# Patient Record
Sex: MALE | Race: WHITE | NOT HISPANIC OR LATINO | Employment: FULL TIME | URBAN - METROPOLITAN AREA
[De-identification: names, ages, dates, MRNs, and addresses within clinical notes are randomized per-mention and may not be internally consistent; named-entity substitution may affect disease eponyms.]

---

## 2017-12-18 ENCOUNTER — OFFICE VISIT (OUTPATIENT)
Dept: FAMILY MEDICINE | Facility: CLINIC | Age: 27
End: 2017-12-18
Payer: OTHER MISCELLANEOUS

## 2017-12-18 VITALS
HEART RATE: 63 BPM | SYSTOLIC BLOOD PRESSURE: 147 MMHG | TEMPERATURE: 97.6 F | OXYGEN SATURATION: 100 % | DIASTOLIC BLOOD PRESSURE: 82 MMHG | WEIGHT: 178 LBS | BODY MASS INDEX: 25.54 KG/M2

## 2017-12-18 DIAGNOSIS — S69.92XS HAND INJURY, LEFT, SEQUELA: Primary | ICD-10-CM

## 2017-12-18 PROCEDURE — 99213 OFFICE O/P EST LOW 20 MIN: CPT | Performed by: FAMILY MEDICINE

## 2017-12-18 RX ORDER — OXYCODONE AND ACETAMINOPHEN 5; 325 MG/1; MG/1
1 TABLET ORAL EVERY 4 HOURS PRN
Qty: 30 TABLET | Refills: 0 | Status: SHIPPED | OUTPATIENT
Start: 2017-12-18 | End: 2018-07-26

## 2017-12-18 ASSESSMENT — PAIN SCALES - GENERAL: PAINLEVEL: SEVERE PAIN (6)

## 2017-12-18 NOTE — MR AVS SNAPSHOT
"              After Visit Summary   2017    Richardson Preston    MRN: [id number]           Patient Information     Date Of Birth          1990        Visit Information        Provider Department      2017 1:50 PM Lam Claire MD [facility]        Today's Diagnoses     Hand injury, left, sequela    -  1       Follow-ups after your visit        Who to contact     If you have questions or need follow up information about today's clinic visit or your schedule please contact [facility] directly at [phone].  Normal or non-critical lab and imaging results will be communicated to you by MyChart, letter or phone within 4 business days after the clinic has received the results. If you do not hear from us within 7 days, please contact the clinic through [email or web address] or phone. If you have a critical or abnormal lab result, we will notify you by phone as soon as possible.  Submit refill requests through Contapps or call your pharmacy and they will forward the refill request to us. Please allow 3 business days for your refill to be completed.          Additional Information About Your Visit        MyChart Information     Contapps lets you send messages to your doctor, view your test results, renew your prescriptions, schedule appointments and more. To sign up, go to [email or web address] . Click on \"Log in\" on the left side of the screen, which will take you to the Welcome page. Then click on \"Sign up Now\" on the right side of the page.     You will be asked to enter the access code listed below, as well as some personal information. Please follow the directions to create your username and password.     Your access code is: [email or web address]  Expires: 3/18/2018  4:57 PM     Your access code will  in 90 days. If you need help or a new code, please call your [facility] or [phone].        Care EveryWhere ID     This is your Care EveryWhere ID. This could be used by other " organizations to access your [place] medical records  [id number]        Your Vitals Were     Pulse Temperature Pulse Oximetry BMI (Body Mass Index)          63 97.6  F (36.4  C) (Oral) 100% 25.54 kg/m2         Blood Pressure from Last 3 Encounters:   12/18/17 147/82   07/05/16 129/81   04/20/16 127/87    Weight from Last 3 Encounters:   12/18/17 178 lb (80.7 kg)   07/05/16 200 lb (90.7 kg)   04/20/16 202 lb 9.6 oz (91.9 kg)              Today, you had the following     No orders found for display         Today's Medication Changes          These changes are accurate as of: 12/18/17  4:57 PM.  If you have any questions, ask your nurse or doctor.               Start taking these medicines.        Dose/Directions    oxyCODONE-acetaminophen 5-325 MG per tablet   Commonly known as:  PERCOCET   Used for:  Hand injury, left, sequela   Started by:  Lam Claire MD        Dose:  1 tablet   Take 1 tablet by mouth every 4 hours as needed for pain (avoid with alcohol) maximum 5 tablet(s) per day   Quantity:  30 tablet   Refills:  0            Where to get your medicines      Some of these will need a paper prescription and others can be bought over the counter.  Ask your nurse if you have questions.     Bring a paper prescription for each of these medications     oxyCODONE-acetaminophen 5-325 MG per tablet                Primary Care Provider Office Phone # Fax #    [facility] [phone] [address] [facility] [address]        Equal Access to Services     HENRIK MCKEON : Hadii aad ku hadasho Soomaali, waaxda luqadaha, qaybta kaalmada adeegyada, amina idikori covarrubias. So [facility] [phone].    ATENCIÓN: Si habla español, tiene a penny disposición servicios gratuitos de asistencia lingüística. Llame al [phone].    We comply with applicable federal civil rights laws and [place] laws. We do not discriminate on the basis of race, color, national origin, age,  disability, sex, sexual orientation, or gender identity.            Thank you!     Thank you for choosing [facility]  for your care. Our goal is always to provide you with excellent care. Hearing back from our patients is one way we can continue to improve our services. Please take a few minutes to complete the written survey that you may receive in the mail after your visit with us. Thank you!             Your Updated Medication List - Protect others around you: Learn how to safely use, store and throw away your medicines at www.disposemymeds.org.          This list is accurate as of: 12/18/17  4:57 PM.  Always use your most recent med list.                   Brand Name Dispense Instructions for use Diagnosis    HYDROcodone-acetaminophen 5-325 MG per tablet    NORCO    8 tablet    Take 1 tablet by mouth every 6 hours as needed for moderate to severe pain Don't drive after taking or mix with alcohol    Boil       oxyCODONE-acetaminophen 5-325 MG per tablet    PERCOCET    30 tablet    Take 1 tablet by mouth every 4 hours as needed for pain (avoid with alcohol) maximum 5 tablet(s) per day    Hand injury, left, sequela

## 2017-12-18 NOTE — PROGRESS NOTES
SUBJECTIVE:  Richardson Preston, a 27 year old male scheduled an appointment to discuss the following issues:  Follow-up hand injury 12/16/17. Xray negative. Swelling and still painful.. Pain slightly better.  No fevers or chills. No pus/redness. Some bruising.   right handed. Splint all time but took off for shower.   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   No ALCOHOL regular.   Emotionally doing ok. Supportive wife.  Hurt elbow too but that feels ok  Per ED note:  Impression and Plan:  Richardson Preston is a 27 y.o. male who presents for evaluation after left arm injury. Signs and symptoms are consistent with left wrist and MCP joint sprains. A broad differential was considered including sprain, strain, fracture, tendon rupture, nerve impingement/compromise, referred pain. Supportive outpatient management is indicated. Rest, ice, and elevation treatment was discussed with the patient. No other complaints during hx or exam. Given the severity of the LAURENCE and amount of pain pt has in hand I am concerned that he may have ligamentous injury and possible game keepers thumb. Pt placed in thumb spica splint and given instructions for outpatient follow up with [facility] hand clinic for further evaluation. As well as discharge precautions and symptoms requiring return to ed. Patient given prescription for Percocet for pain.    Medical, social, surgical, and family histories reviewed.    ROS:   All other ROS negative  OBJECTIVE:  /82  Pulse 63  Temp 97.6  F (36.4  C) (Oral)  Wt 178 lb (80.7 kg)  SpO2 100%  BMI 25.54 kg/m2  EXAM:  GENERAL APPEARANCE: healthy, alert and no distress  RESP: lungs clear to auscultation - no rales, rhonchi or wheezes  CV: regular rates and rhythm, normal S1 S2, no S3 or S4 and no murmur, click or rub -  ABDOMEN:  soft, nontender, no HSM or masses and bowel sounds normal  MS: extremities normal- no gross deformities noted, no evidence of inflammation in joints, FROM in all  extremities.  MS: swelling and tenderness base of left thumb. Pain with RANGE OF MOTION thumb. Good RANGE OF MOTION wrist and elbow.  SKIN: no suspicious lesions or rashes  NEURO: Normal strength and tone, sensory exam grossly normal, mentation intact and speech normal  PSYCH: mentation appears normal and affect normal/bright    ASSESSMENT / PLAN:  (S69.92XS) Hand injury, left, sequela  (primary encounter diagnosis)  Comment: likely ligament/tendon  Plan: oxyCODONE-acetaminophen (PERCOCET) 5-325 MG per        tablet        Continue brace and prn percocet. Avoid with ALCOHOL. Ice/rest. Seeing hand specialist in 4 days. Expected course and warning signs reviewed. Call/email with questions/concerns  New ace/padding given.   Lam Claire MD

## 2017-12-18 NOTE — NURSING NOTE
"Chief Complaint   Patient presents with     Musculoskeletal Problem     left hand injury       Initial /82  Pulse 63  Temp 97.6  F (36.4  C) (Oral)  Wt 178 lb (80.7 kg)  SpO2 100%  BMI 25.54 kg/m2 Estimated body mass index is 25.54 kg/(m^2) as calculated from the following:    Height as of 4/20/16: 5' 10\" (1.778 m).    Weight as of this encounter: 178 lb (80.7 kg).  Medication Reconciliation: complete  Rosa Sams M.A.  "

## 2017-12-22 ENCOUNTER — TELEPHONE (OUTPATIENT)
Dept: FAMILY MEDICINE | Facility: CLINIC | Age: 27
End: 2017-12-22

## 2017-12-22 NOTE — TELEPHONE ENCOUNTER
This is a WC follow up from ER please fax the office note for 12/18/2017 to  [phone]   Claim # is  [id number]   Thank you

## 2018-07-26 ENCOUNTER — OFFICE VISIT (OUTPATIENT)
Dept: URGENT CARE | Facility: URGENT CARE | Age: 28
End: 2018-07-26
Payer: COMMERCIAL

## 2018-07-26 ENCOUNTER — RADIANT APPOINTMENT (OUTPATIENT)
Dept: GENERAL RADIOLOGY | Facility: CLINIC | Age: 28
End: 2018-07-26
Attending: NURSE PRACTITIONER
Payer: COMMERCIAL

## 2018-07-26 VITALS
OXYGEN SATURATION: 96 % | BODY MASS INDEX: 28.7 KG/M2 | WEIGHT: 200 LBS | SYSTOLIC BLOOD PRESSURE: 120 MMHG | TEMPERATURE: 97.4 F | HEART RATE: 71 BPM | DIASTOLIC BLOOD PRESSURE: 75 MMHG

## 2018-07-26 DIAGNOSIS — M25.562 ACUTE PAIN OF LEFT KNEE: Primary | ICD-10-CM

## 2018-07-26 DIAGNOSIS — M25.562 ACUTE PAIN OF LEFT KNEE: ICD-10-CM

## 2018-07-26 LAB
BASOPHILS # BLD AUTO: 0 10E9/L (ref 0–0.2)
BASOPHILS NFR BLD AUTO: 0.3 %
DIFFERENTIAL METHOD BLD: NORMAL
EOSINOPHIL # BLD AUTO: 0.4 10E9/L (ref 0–0.7)
EOSINOPHIL NFR BLD AUTO: 4.6 %
ERYTHROCYTE [DISTWIDTH] IN BLOOD BY AUTOMATED COUNT: 13.3 % (ref 10–15)
ERYTHROCYTE [SEDIMENTATION RATE] IN BLOOD BY WESTERGREN METHOD: 4 MM/H (ref 0–15)
HCT VFR BLD AUTO: 43.2 % (ref 40–53)
HGB BLD-MCNC: 14.8 G/DL (ref 13.3–17.7)
LYMPHOCYTES # BLD AUTO: 4 10E9/L (ref 0.8–5.3)
LYMPHOCYTES NFR BLD AUTO: 43.5 %
MCH RBC QN AUTO: 30.7 PG (ref 26.5–33)
MCHC RBC AUTO-ENTMCNC: 34.3 G/DL (ref 31.5–36.5)
MCV RBC AUTO: 90 FL (ref 78–100)
MONOCYTES # BLD AUTO: 0.7 10E9/L (ref 0–1.3)
MONOCYTES NFR BLD AUTO: 8 %
NEUTROPHILS # BLD AUTO: 4 10E9/L (ref 1.6–8.3)
NEUTROPHILS NFR BLD AUTO: 43.6 %
PLATELET # BLD AUTO: 235 10E9/L (ref 150–450)
RBC # BLD AUTO: 4.82 10E12/L (ref 4.4–5.9)
WBC # BLD AUTO: 9.1 10E9/L (ref 4–11)

## 2018-07-26 PROCEDURE — 85652 RBC SED RATE AUTOMATED: CPT | Performed by: NURSE PRACTITIONER

## 2018-07-26 PROCEDURE — 85025 COMPLETE CBC W/AUTO DIFF WBC: CPT | Performed by: NURSE PRACTITIONER

## 2018-07-26 PROCEDURE — 99214 OFFICE O/P EST MOD 30 MIN: CPT | Performed by: NURSE PRACTITIONER

## 2018-07-26 PROCEDURE — 36415 COLL VENOUS BLD VENIPUNCTURE: CPT | Performed by: NURSE PRACTITIONER

## 2018-07-26 PROCEDURE — 73560 X-RAY EXAM OF KNEE 1 OR 2: CPT | Mod: LT

## 2018-07-26 PROCEDURE — 86618 LYME DISEASE ANTIBODY: CPT | Performed by: NURSE PRACTITIONER

## 2018-07-26 RX ORDER — NAPROXEN 500 MG/1
500 TABLET ORAL 2 TIMES DAILY PRN
Qty: 30 TABLET | Refills: 1 | Status: SHIPPED | OUTPATIENT
Start: 2018-07-26 | End: 2022-04-27

## 2018-07-26 ASSESSMENT — ENCOUNTER SYMPTOMS
NEUROLOGICAL NEGATIVE: 1
CONSTITUTIONAL NEGATIVE: 1
CARDIOVASCULAR NEGATIVE: 1
FOCAL WEAKNESS: 0
RESPIRATORY NEGATIVE: 1

## 2018-07-26 NOTE — LETTER
[facility]  [address] [facility] [address]  Phone: [phone]    July 26, 2018        Richardson Preston  [address] [facility] [address]          To whom it may concern:    RE: Richardson Preston    Patient was seen and treated today at our clinic.  He has been advised to rest and elevate his leg for a couple days.  He will need to be off work tomorrow (7/27/18)  He may return Monday (7/30/18) without restriction.     Please contact me for questions or concerns.      Sincerely,        JONAH Macdonald CNP

## 2018-07-26 NOTE — PATIENT INSTRUCTIONS
Knee Pain  Knee pain is very common. It s especially common in active people who put a lot of pressure on their knees, like runners. It affects women more often than men.  Your kneecap (patella) is a thick, round bone. It covers and protects the front portion of your knee joint. It moves along a groove in your thighbone (femur) as part of the patellofemoral joint. A layer of cartilage surrounds the underside of your kneecap. This layer protects it from grinding against your femur.  When this cartilage softens and breaks down, it can cause knee pain. This is partly because of repetitive stress. The stress irritates the lining of the joint. This causes pain in the underlying bone.  What causes knee pain?  Many things can cause knee pain. You may have more than one cause. Some of these include:    Overuse of the knee joint    The kneecap doesn t line up with the tissue around it    Damage to small nerves in the area    Damage to the ligament-like structure that holds the kneecap in place (retinaculum)    Breakdown of the bone under the cartilage    Swelling in the soft tissues around the kneecap    Injury  You might be more likely to have knee pain if you:    Exercise a lot    Recently increased the intensity of your workouts    Have a body mass index (BMI) greater than 25    Have poor alignment of your kneecap    Walk with your feet turned overly outward or inward    Have weakness in surrounding muscle groups (inner quad or hip adductor muscles)    Have too much tightness in surrounding muscle groups (hamstrings or iliotibial band)    Have a recent history of injury to the area    Are female  Symptoms of knee pain  This type of knee pain is a dull, aching pain in the front of the knee in the area under and around the kneecap. This pain may start quickly or slowly. Your pain might be worse when you squat, run, or sit for a long time. You might also sometimes feel like your knee is giving out. You may have symptoms in  one or both of your knees.  Diagnosing knee pain  Your healthcare provider will ask about your medical history and your symptoms. Be sure to describe any activities that make your knee pain worse. He or she will look at your knee. This will include tests of your range of motion, strength, and areas of pain of your knee. Your knee alignment will be checked.  Your healthcare provider will need to rule out other causes of your knee pain, such as arthritis. You may need an imaging test, such as an X-ray or MRI.  Treatment for knee pain  Treatments that can help ease your symptoms may include:    Avoiding activities for a while that make your pain worse, returning to activity over time    Icing the outside of your knee when it causes you pain    Taking over-the-counter pain medicine    Wearing a knee brace or taping your knee to support it    Wearing special shoe inserts to help keep your feet in the proper alignment    Doing special exercises to stretch and strengthen the muscles around your hip and your knee  These steps help most people manage knee pain. But some cases of knee pain need to be treated with surgery. You may need surgery right away. Or you may need it later if other treatments don t work. Your healthcare provider may refer you to an orthopedic surgeon. He or she will talk with you about your choices.  Preventing knee pain  Losing weight and correcting excess muscle tightness or muscle weakness may help lower your risk.  In some cases, you can prevent knee pain. To help prevent a flare-up of knee pain, you do these things:    Regularly do all the exercises your doctor or physical therapist advises    Support your knee as advised by your doctor or physical therapist    Increase training gradually, and ease up on training when needed    Have an expert check your gait for running or other sporting activities    Stretch properly before and after exercise    Replace your running shoes regularly    Lose excess  weight     When to call your healthcare provider  Call your healthcare provider right away if:    Your symptoms don t get better after a few weeks of treatment    You have any new symptoms   Date Last Reviewed: 4/1/2017 2000-2017 [employer]. [address] [place] [address], [place] [address]. All rights reserved. This information is not intended as a substitute for professional medical care. Always follow your healthcare professional's instructions.

## 2018-07-26 NOTE — MR AVS SNAPSHOT
After Visit Summary   7/26/2018    Richardson Preston    MRN: [id number]           Patient Information     Date Of Birth          1990        Visit Information        Provider Department      7/26/2018 5:15 PM Celina Gage APRN [facility]        Today's Diagnoses     Acute pain of left knee    -  1      Care Instructions      Knee Pain  Knee pain is very common. It s especially common in active people who put a lot of pressure on their knees, like runners. It affects women more often than men.  Your kneecap (patella) is a thick, round bone. It covers and protects the front portion of your knee joint. It moves along a groove in your thighbone (femur) as part of the patellofemoral joint. A layer of cartilage surrounds the underside of your kneecap. This layer protects it from grinding against your femur.  When this cartilage softens and breaks down, it can cause knee pain. This is partly because of repetitive stress. The stress irritates the lining of the joint. This causes pain in the underlying bone.  What causes knee pain?  Many things can cause knee pain. You may have more than one cause. Some of these include:    Overuse of the knee joint    The kneecap doesn t line up with the tissue around it    Damage to small nerves in the area    Damage to the ligament-like structure that holds the kneecap in place (retinaculum)    Breakdown of the bone under the cartilage    Swelling in the soft tissues around the kneecap    Injury  You might be more likely to have knee pain if you:    Exercise a lot    Recently increased the intensity of your workouts    Have a body mass index (BMI) greater than 25    Have poor alignment of your kneecap    Walk with your feet turned overly outward or inward    Have weakness in surrounding muscle groups (inner quad or hip adductor muscles)    Have too much tightness in surrounding muscle groups (hamstrings or iliotibial band)    Have a recent  history of injury to the area    Are female  Symptoms of knee pain  This type of knee pain is a dull, aching pain in the front of the knee in the area under and around the kneecap. This pain may start quickly or slowly. Your pain might be worse when you squat, run, or sit for a long time. You might also sometimes feel like your knee is giving out. You may have symptoms in one or both of your knees.  Diagnosing knee pain  Your healthcare provider will ask about your medical history and your symptoms. Be sure to describe any activities that make your knee pain worse. He or she will look at your knee. This will include tests of your range of motion, strength, and areas of pain of your knee. Your knee alignment will be checked.  Your healthcare provider will need to rule out other causes of your knee pain, such as arthritis. You may need an imaging test, such as an X-ray or MRI.  Treatment for knee pain  Treatments that can help ease your symptoms may include:    Avoiding activities for a while that make your pain worse, returning to activity over time    Icing the outside of your knee when it causes you pain    Taking over-the-counter pain medicine    Wearing a knee brace or taping your knee to support it    Wearing special shoe inserts to help keep your feet in the proper alignment    Doing special exercises to stretch and strengthen the muscles around your hip and your knee  These steps help most people manage knee pain. But some cases of knee pain need to be treated with surgery. You may need surgery right away. Or you may need it later if other treatments don t work. Your healthcare provider may refer you to an orthopedic surgeon. He or she will talk with you about your choices.  Preventing knee pain  Losing weight and correcting excess muscle tightness or muscle weakness may help lower your risk.  In some cases, you can prevent knee pain. To help prevent a flare-up of knee pain, you do these things:    Regularly  do all the exercises your doctor or physical therapist advises    Support your knee as advised by your doctor or physical therapist    Increase training gradually, and ease up on training when needed    Have an expert check your gait for running or other sporting activities    Stretch properly before and after exercise    Replace your running shoes regularly    Lose excess weight     When to call your healthcare provider  Call your healthcare provider right away if:    Your symptoms don t get better after a few weeks of treatment    You have any new symptoms   Date Last Reviewed: 4/1/2017 2000-2017 [employer]. [address] [place] [address]. All rights reserved. This information is not intended as a substitute for professional medical care. Always follow your healthcare professional's instructions.                Follow-ups after your visit        Who to contact     If you have questions or need follow up information about today's clinic visit or your schedule please contact [facility] directly at [phone].  Normal or non-critical lab and imaging results will be communicated to you by MyChart, letter or phone within 4 business days after the clinic has received the results. If you do not hear from us within 7 days, please contact the clinic through MyChart or phone. If you have a critical or abnormal lab result, we will notify you by phone as soon as possible.  Submit refill requests through [employer] or call your pharmacy and they will forward the refill request to us. Please allow 3 business days for your refill to be completed.          Additional Information About Your Visit        Care EveryWhere ID     This is your Care EveryWhere ID. This could be used by other organizations to access your [place] medical records  [id number]        Your Vitals Were     Pulse Temperature Pulse Oximetry BMI (Body Mass Index)          71 97.4  F (36.3  C) (Oral) 96% 28.7 kg/m2          Blood Pressure from Last 3 Encounters:   07/26/18 120/75   12/18/17 147/82   07/05/16 129/81    Weight from Last 3 Encounters:   07/26/18 200 lb (90.7 kg)   12/18/17 178 lb (80.7 kg)   07/05/16 200 lb (90.7 kg)              We Performed the Following     CBC with platelets differential     ESR: Erythrocyte sedimentation rate     Lyme Disease Mariann with reflex to WB Serum          Today's Medication Changes          These changes are accurate as of 7/26/18  6:59 PM.  If you have any questions, ask your nurse or doctor.               Start taking these medicines.        Dose/Directions    naproxen 500 MG tablet   Commonly known as:  NAPROSYN   Used for:  Acute pain of left knee   Started by:  Celina Gage APRN CNP        Dose:  500 mg   Take 1 tablet (500 mg) by mouth 2 times daily as needed for moderate pain   Quantity:  30 tablet   Refills:  1         Stop taking these medicines if you haven't already. Please contact your care team if you have questions.     HYDROcodone-acetaminophen 5-325 MG per tablet   Commonly known as:  NORCO   Stopped by:  Celina Gage APRN CNP           oxyCODONE-acetaminophen 5-325 MG per tablet   Commonly known as:  PERCOCET   Stopped by:  Celina Gage APRN CNP                Where to get your medicines      These medications were sent to [facility][id number] - [facility] [place], [place] - [address],   [address] [address], , [facility] [address]     Phone:  [phone]     naproxen 500 MG tablet                Primary Care Provider Office Phone # Fax #    [facility] [phone] [address] [facility] [address]        Equal Access to Services     [facility] : Hadii viktoriya manning hadamadeoo Sonicolas, waaxda luqadaha, qaybta kaalmada adeegyada, amina covarrubias. So [facility] [phone].    ATENCIÓN: Si habla español, tiene a penny disposición servicios gratuitos de asistencia lingüística. Llame al  [phone].    We comply with applicable federal civil rights laws and [place] laws. We do not discriminate on the basis of race, color, national origin, age, disability, sex, sexual orientation, or gender identity.            Thank you!     Thank you for choosing [facility]  for your care. Our goal is always to provide you with excellent care. Hearing back from our patients is one way we can continue to improve our services. Please take a few minutes to complete the written survey that you may receive in the mail after your visit with us. Thank you!             Your Updated Medication List - Protect others around you: Learn how to safely use, store and throw away your medicines at www.disposemymeds.org.          This list is accurate as of 7/26/18  6:59 PM.  Always use your most recent med list.                   Brand Name Dispense Instructions for use Diagnosis    naproxen 500 MG tablet    NAPROSYN    30 tablet    Take 1 tablet (500 mg) by mouth 2 times daily as needed for moderate pain    Acute pain of left knee

## 2018-07-27 NOTE — PROGRESS NOTES
HPI  Richardson Preston 28 year old presents with CHIEF COMPLAINT of acute onset left knee pain. No injury or trauma. This knee flared about 3 yrs ago with no known diagnosis and symptoms resolution with cortisone injection. No recent illness, rash or known tick exposure.     Past Medical History:   Diagnosis Date     Anxiety      Depressive disorder      NO ACTIVE PROBLEMS      Past Surgical History:   Procedure Laterality Date     ORTHOPEDIC SURGERY       SURGICAL HISTORY OF -       Rt 4th Finger (Traumatic Injury)     Patient Active Problem List   Diagnosis     CARDIOVASCULAR SCREENING; LDL GOAL LESS THAN 160     Allergies   Allergen Reactions     Doxycycline GI Disturbance     Current Outpatient Prescriptions   Medication     naproxen (NAPROSYN) 500 MG tablet     No current facility-administered medications for this visit.          Review of Systems   Constitutional: Negative.    Respiratory: Negative.    Cardiovascular: Negative.    Musculoskeletal: Positive for joint pain.        Left knee     Skin: Negative.    Neurological: Negative.  Negative for focal weakness.   All other systems reviewed and are negative.        Physical Exam   Constitutional: He is well-developed, well-nourished, and in no distress. No distress.   Appears uncomfortable with ambulation.   HENT:   Head: Normocephalic.   Eyes: Conjunctivae are normal.   Cardiovascular: Normal rate, regular rhythm and normal heart sounds.    Pulmonary/Chest: Effort normal and breath sounds normal.   Abdominal: There is no tenderness.   Musculoskeletal:        Left knee: He exhibits effusion. He exhibits normal range of motion, no ecchymosis, no deformity, no erythema, normal alignment, no LCL laxity, normal patellar mobility, normal meniscus and no MCL laxity. Tenderness found.   Slight effusion and tenderness is most pronounced posterior knee.    Neurological: He is alert.   Skin: Skin is dry. No rash noted. No erythema.   Nursing note and vitals  reviewed.      Results for orders placed or performed in visit on 07/26/18   CBC with platelets differential   Result Value Ref Range    WBC 9.1 4.0 - 11.0 10e9/L    RBC Count 4.82 4.4 - 5.9 10e12/L    Hemoglobin 14.8 13.3 - 17.7 g/dL    Hematocrit 43.2 40.0 - 53.0 %    MCV 90 78 - 100 fl    MCH 30.7 26.5 - 33.0 pg    MCHC 34.3 31.5 - 36.5 g/dL    RDW 13.3 10.0 - 15.0 %    Platelet Count 235 150 - 450 10e9/L    Diff Method Automated Method     % Neutrophils 43.6 %    % Lymphocytes 43.5 %    % Monocytes 8.0 %    % Eosinophils 4.6 %    % Basophils 0.3 %    Absolute Neutrophil 4.0 1.6 - 8.3 10e9/L    Absolute Lymphocytes 4.0 0.8 - 5.3 10e9/L    Absolute Monocytes 0.7 0.0 - 1.3 10e9/L    Absolute Eosinophils 0.4 0.0 - 0.7 10e9/L    Absolute Basophils 0.0 0.0 - 0.2 10e9/L   ESR: Erythrocyte sedimentation rate   Result Value Ref Range    Sed Rate 4 0 - 15 mm/h     Xray of left knee 2 views unremarkable    Assessment:  1. Acute pain of left knee        Plan:  Orders Placed This Encounter     XR Knee Standing Left 2 Views     CBC with platelets differential     ESR: Erythrocyte sedimentation rate     Lyme Disease Mariann with reflex to WB Serum     naproxen (NAPROSYN) 500 MG tablet       Instructions regarding self-care of patient/child reviewed.   Written instructions provided in after visit summary and reviewed.  Patient instructed to see primary care provider for new or persistent symptoms.   Red flag symptoms reviewed and patient has been instructed to seek emergent   Care at the closest emergency room for evaluation and treatment.   Please contact pharmacy for medication questions.  Patient instructed to take medications as directed on package.      Celina Ggae, APRN, CNP

## 2018-07-31 LAB — B BURGDOR IGG+IGM SER QL: 0.03 (ref 0–0.89)

## 2022-04-27 ENCOUNTER — OFFICE VISIT (OUTPATIENT)
Dept: URGENT CARE | Facility: URGENT CARE | Age: 32
End: 2022-04-27
Payer: COMMERCIAL

## 2022-04-27 ENCOUNTER — NURSE TRIAGE (OUTPATIENT)
Dept: NURSING | Facility: CLINIC | Age: 32
End: 2022-04-27
Payer: COMMERCIAL

## 2022-04-27 VITALS
SYSTOLIC BLOOD PRESSURE: 120 MMHG | DIASTOLIC BLOOD PRESSURE: 80 MMHG | BODY MASS INDEX: 31.25 KG/M2 | WEIGHT: 217.8 LBS | HEART RATE: 84 BPM | OXYGEN SATURATION: 97 % | TEMPERATURE: 97.8 F

## 2022-04-27 DIAGNOSIS — G56.22 ULNAR NEUROPATHY AT ELBOW OF LEFT UPPER EXTREMITY: Primary | ICD-10-CM

## 2022-04-27 PROCEDURE — 99204 OFFICE O/P NEW MOD 45 MIN: CPT | Performed by: FAMILY MEDICINE

## 2022-04-27 RX ORDER — CEFDINIR 300 MG/1
1 CAPSULE ORAL 2 TIMES DAILY
COMMUNITY
Start: 2022-04-14 | End: 2022-08-15

## 2022-04-27 NOTE — LETTER
[facility] [place]  [address] [facility] [address]  Phone: [phone]    April 27, 2022        Richardson Preston  [address] [place] [address]          To whom it may concern:    RE: Richardson Preston    Patient was seen and treated today at our clinic.  Please excuse from work on April 28 and April 29,2022    Please contact me for questions or concerns.      Sincerely,        Ginger Pearson MD

## 2022-04-27 NOTE — PATIENT INSTRUCTIONS
Patients should avoid leaning on the elbows when seated or driving and should avoid prolonged elbow flexion [62]. Practical suggestions include using the other hand or a headset when on the telephone and avoiding sitting with the arms crossed.    The use of a soft foam elbow pad may reduce inadvertent compression of the ulnar nerve at the elbow. To prevent excessive or prolonged elbow flexion, splints that limit flexion to 45 to 90 degrees can be used at night, although patient compliance is often suboptimal [63].    Patients may wrap the affected elbow with a towel at night to limit flexion, since a towel wrap is usually better tolerated than more rigid braces. In our experience, these simple measures often result in improvement of milder intermittent symptoms and can also aid in resolution of those with moderate presentations (eg, conduction block on electrodiagnostic testing).

## 2022-04-27 NOTE — PROGRESS NOTES
Chief complaint: numbness     Left arm for the last week has been feeling tight and numb    The left 4th and 5th digit very tight    Trying to  keys patient couldn't get his fingers to move    Today just not getting enough strength - gets numb and tingly - not really painful     No injuries that he can remember     From the elbow down    Patient has chronic neck pain  No chest pain or shortness of breath    Patient is a smoker    Patient has had a lump on the left elbow for about 6-8 months after he bumped it somewhere     Feels safe no thoughts of harming self or others    No fevers or chills chest pain or shortness of breath      Allergies   Allergen Reactions     Doxycycline GI Disturbance       Past Medical History:   Diagnosis Date     Anxiety      Depressive disorder      NO ACTIVE PROBLEMS        cefdinir (OMNICEF) 300 MG capsule, 1 capsule 2 times daily    No current facility-administered medications on file prior to visit.      Social History     Tobacco Use     Smoking status: Current Every Day Smoker     Packs/day: 0.50     Smokeless tobacco: Never Used   Substance Use Topics     Alcohol use: Yes     Comment: occ     Drug use: No       ROS:  review of systems negative except for noted above.       OBJECTIVE:  /80   Pulse 84   Temp 97.8  F (36.6  C)   Wt 98.8 kg (217 lb 12.8 oz)   SpO2 97%   BMI 31.25 kg/m     General:   awake, alert, and cooperative.  NAD.   Head: Normocephalic, atraumatic.  Eyes: Conjunctiva clear  Neuro: Alert and oriented - normal speech.  MS: Using extremities freely  Affected extremity neurovascularly intact, no cyanosis or edema, good pulses and capillary refill.   No upper motor neuron signs  No sensory deficits bilateral upper extremity  MMT 5/5 bilateral upper extremity negative [place]  Patient having tingling of the left 4th and 5th digit  And positive TINNELS at the ulnar groove in the elbow  He does have some swelling there in that area ? [place]   Rest of  Neuro exam normal no cranial deficits normal cerebellar function   PSYCH:  Normal affect, normal speech  SKIN: no obvious rashes    ASSESSMENT:    ICD-10-CM    1. Ulnar neuropathy at elbow of left upper extremity  G56.22 Orthopedic  Referral       PLAN:   Supportive measures  See AVS  Referred to orthopedics for follow up  If worsening weakness or concerns recommend ER to consider MRI imaging. Low clinical suspicion and no alarm signs or symptoms at this time.     Patient works as a  and the past few days has had prolonged driving likely causing these symptoms   Advised about symptoms which might herald more serious problems.        Ginger Pearson MD

## 2022-04-27 NOTE — TELEPHONE ENCOUNTER
Patient calling about numbness and tingling in left shoulder, arm and fingers. 4th and 5th fingers are also stiff and numb.    Sensation has been present for a week and in constant. Patient cannot recall a cause or injury. Patient does sit for prolonged periods of time being a .    Per protocol, patient should be seen in clinic today. Urgent Care advised as there are no [facility] clinic appointments availbale today. Patient agreed.    Haylie Sams RN  04/27/22 9:38 AM  [facility] Nurse Advisor    Reason for Disposition    Tingling (e.g., pins and needles) of the face, arm or leg on one side of the body, that is  present now (Exceptions: chronic/recurrent symptom lasting > 4 weeks or tingling from known cause, such as: bumped elbow, carpal tunnel syndrome, pinched nerve, frostbite)    Additional Information    Negative: New neurologic deficit that is present NOW, sudden onset of ANY of the following: * Weakness of the face, arm, or leg on one side of the body* Numbness of the face, arm, or leg on one side of the body* Loss of speech or garbled speech    Negative: Difficult to awaken or acting confused (e.g., disoriented, slurred speech)    Negative: Sounds like a life-threatening emergency to the triager    Negative: Confusion, disorientation, or hallucinations is the main symptom    Negative: Dizziness is the main symptom    Negative: Followed a head injury within last 3 days    Negative: Headache (with neurologic deficit)    Negative: Unable to urinate (or only a few drops) and bladder feels very full    Negative: Loss of control of bowel or bladder (i.e., incontinence) of new onset    Negative: Back pain with numbness (loss of sensation) in groin or rectal area    Negative: Patient sounds very sick or weak to the triager    Negative: Neurologic deficit that was brief (now gone), ANY of the following: * Weakness of the face, arm, or leg on one side of the body * Numbness of the face, arm, or leg on  one side of the body * Loss of speech or garbled speech    Negative: Neurologic deficit of gradual onset, ANY of the following: * Weakness of the face, arm, or leg on one side of the body * Numbness of the face, arm, or leg on one side of the body * Loss of speech or garbled speech    Negative: Cutler palsy suspected (i.e., weakness only one side of the face, developing over hours to days, no other symptoms)    Protocols used: NEUROLOGIC DEFICIT-A-OH    COVID 19 Nurse Triage Plan/Patient Instructions    Please be aware that novel coronavirus (COVID-19) may be circulating in the community. If you develop symptoms such as fever, cough, or SOB or if you have concerns about the presence of another infection including coronavirus (COVID-19), please contact your health care provider or visit [email or web address].     Disposition/Instructions    In-Person Visit with provider recommended. Reference Visit Selection Guide.    Thank you for taking steps to prevent the spread of this virus.  o Limit your contact with others.  o Wear a simple mask to cover your cough.  o Wash your hands well and often.    Resources    [facility]: About COVID-19: [email or web address]    CDC: What to Do If You're Sick: www.cdc.gov/coronavirus/2019-ncov/about/steps-when-sick.html    CDC: Ending Home Isolation: www.cdc.gov/coronavirus/2019-ncov/hcp/disposition-in-home-patients.html     CDC: Caring for Someone: www.cdc.gov/coronavirus/2019-ncov/if-you-are-sick/care-for-someone.html     [facility]: Interim Guidance for Hospital Discharge to Home: www.health.[facility].[place]/diseases/coronavirus/hcp/hospdischarge.pdf    [facility] clinical trials (COVID-19 research studies): [email or web address]     Below are the COVID-19 hotlines at the [place] Department of Health ([facility]). Interpreters are available.   o For health questions: Call 749-377-6798 or 1-676.738.3299 (7 a.m. to 7 p.m.)  o For questions  about schools and childcare: Call 125-259-4401 or 1-840.604.4636 (7 a.m. to 7 p.m.)

## 2022-04-29 ENCOUNTER — OFFICE VISIT (OUTPATIENT)
Dept: ORTHOPEDICS | Facility: CLINIC | Age: 32
End: 2022-04-29
Payer: COMMERCIAL

## 2022-04-29 VITALS
HEART RATE: 88 BPM | HEIGHT: 70 IN | BODY MASS INDEX: 31.07 KG/M2 | DIASTOLIC BLOOD PRESSURE: 81 MMHG | SYSTOLIC BLOOD PRESSURE: 124 MMHG | WEIGHT: 217 LBS

## 2022-04-29 DIAGNOSIS — G56.22 ULNAR NEUROPATHY AT ELBOW OF LEFT UPPER EXTREMITY: Primary | ICD-10-CM

## 2022-04-29 PROCEDURE — 99203 OFFICE O/P NEW LOW 30 MIN: CPT | Performed by: ORTHOPAEDIC SURGERY

## 2022-04-29 RX ORDER — METHYLPREDNISOLONE 4 MG
TABLET, DOSE PACK ORAL
Qty: 21 TABLET | Refills: 0 | Status: SHIPPED | OUTPATIENT
Start: 2022-04-29 | End: 2022-08-15

## 2022-04-29 ASSESSMENT — PAIN SCALES - GENERAL: PAINLEVEL: MILD PAIN (3)

## 2022-04-29 NOTE — PROGRESS NOTES
"Chief Complaint:   Chief Complaint   Patient presents with     Left Elbow - Numbness, Pain     Left elbow pain and numbness for the past week, no recent injury or trauma. He has numbness in his forearm and into all digits. In fingers 4 and 5 he has a tightness sensation. He is right handed and is a . He noes weakness with picking up objects, fine mortar skills, and turning the ignition in his truck.         HPI: Richardson Preston is a 31 year old male , right -hand dominant, who presents for evaluation and management of a left elbow pain and numbness for the past wee, without specific injury. He has numbness in the entire forearm from the elbow and to all fingers. In the ring/small finger he has a \"tightness\"  Or \"swelling\" sensation. He has some weakness with picking up objects, fine motor skills, turning the ignition on his truck. No increased activities or change in activities he can relate to onset. Maybe a little better today than yesterday, felt something \"pop\" and seemed to help.    He works as a .    Has regular neck soreness.    History of knee problems.      He reports having mild pain/discomfort at the elbow. He denies numbness or tingling.   Pain severity: 3/10  Pain quality: pins and needles  Frequency of symptoms: are constant  Aggravated by: nothing  Relieved by: nothing    Treatment up to this point:nothing  Has not tried: ice, Heat, Tylenol, NSAIDS, PT and Corticosteroid injection   Prior history of related problems: no prior problems with this area in the past    Usual level of work activity: .    Past medical history:  has a past medical history of Anxiety, Depressive disorder, and NO ACTIVE PROBLEMS.   Patient Active Problem List   Diagnosis     CARDIOVASCULAR SCREENING; LDL GOAL LESS THAN 160       Past surgical history:  has a past surgical history that includes surgical history of -  and orthopedic surgery.     Medications:   Current Outpatient Medications: " "     cefdinir (OMNICEF) 300 MG capsule, 1 capsule 2 times daily, Disp: , Rfl:     Allergies:     Allergies   Allergen Reactions     Doxycycline GI Disturbance        Family History: family history is not on file.     Social History: .  reports that he has been smoking. He has been smoking about 0.50 packs per day. He has never used smokeless tobacco. He reports current alcohol use. He reports that he does not use drugs.    Review of Systems:  ROS: 10 point ROS neg other than the symptoms noted above in the HPI and past medical history.    Physical Exam  GENERAL APPEARANCE: healthy, alert, no distress.   SKIN: no suspicious lesions or rashes  RESPIRATORY: No increased work of breathing.  NEURO: Normal strength and tone, mentation intact and speech normal  PSYCH:  mentation appears normal and affect normal. Not anxious.  Hands: no clubbing, nail pitting or nodes.    /81   Pulse 88   Ht 1.778 m (5' 10\")   Wt 98.4 kg (217 lb)   BMI 31.14 kg/m         left UPPER EXTREMITY:    Sensation intact to light touch in median, radial, ulnar and axillary nerve distributions, but states feels slight decreased compared to the right over the finger tips, palm, forearm.  Palpable 2+ radial pulse, brisk capillary refill to all fingers, wwp  Intact epl fpl fdp edc wrist flexion/extension biceps triceps deltoid  DTR's normal biceps and brachioradialis    ELBOW:  Skin intact. No open wounds. No skin maceration.  Inspection: no swelling, no ecchymosis, no olecranon bursa swelling, no distal bicep tendon defect  Tender: medial elbow/ulnar nerve  Range of Motion: all normal  Strength: elbow strength full  There is no deformity in the area.    Positive Tinels at the medial elbow. positive ulnar nerve compression medial elbow    WRIST/HAND  Positive tinels at the wrist over the median nerve  Equivocal median nerve compression test at the wrist.  Slight decreased  strength of the hand  Negative froments sign   Good " interossei strength        X-rays: none indicated    Assessment: 31 year old male , right -hand dominant, with acute left upper extremity pain, numbness and tingling, likely transient ulnar neuropathy, possible carpal tunnel syndrome.      * discussed with patient signs and symptoms consistent with ulnar neuropathy / cubital tunnel syndrome. Cubital tunnel syndrome is compression or pinching of the ulnar nerve at the elbow. There are many different causes, and in most cases, multifactorial.  * could be related from leaning on his elbow while driving, having elbow bent at night    * An indepth discussion was had with him about the options for treatment, which included activity modification to avoid aggravating activities, taking breaks during activities that cause symptoms, stretching, NSAIDS to help decrease inflammation and swelling within the cubital tunnel, night splinting, and lastly some sort of surgery.    * depending upon severity and duration of symptoms, nonoperative treatment is usually initiated, starting with least invasive modalities such as activity modification and a trial of night splints and NSAIDs.    * avoid leaning on elbow, avoid continuous positioning with elbow being bent especially at night  * will try a course of oral steroids  * consider EMG , hand therapy referral in future for night extension splints as needed.    *return to clinic as needed.  * all patient's questions addressed and answered today.    * All questions were addressed and answered prior to discharge from clinic today. The patient acknowledges an understanding of and agreement with the plan set forth during today's visit. Patient was advised to call our office or MyChart us if any further questions arise upon leaving our office today.        Randolph Hunter M.D., M.S.  Dept. of Orthopaedic Surgery  [facility]  .      Randolph Hunter M.D., M.S.  Dept. of Orthopaedic Surgery  [facility]

## 2022-04-29 NOTE — LETTER
"    4/29/2022         RE: Richardson Preston  [address] [place] [address]        Dear Colleague,    Thank you for referring your patient, Richardson Preston, to the [facility]. Please see a copy of my visit note below.    Chief Complaint:   Chief Complaint   Patient presents with     Left Elbow - Numbness, Pain     Left elbow pain and numbness for the past week, no recent injury or trauma. He has numbness in his forearm and into all digits. In fingers 4 and 5 he has a tightness sensation. He is right handed and is a . He noes weakness with picking up objects, fine mortar skills, and turning the ignition in his truck.         HPI: Richardson Preston is a 31 year old male , right -hand dominant, who presents for evaluation and management of a left elbow pain and numbness for the past wee, without specific injury. He has numbness in the entire forearm from the elbow and to all fingers. In the ring/small finger he has a \"tightness\"  Or \"swelling\" sensation. He has some weakness with picking up objects, fine motor skills, turning the ignition on his truck. No increased activities or change in activities he can relate to onset. Maybe a little better today than yesterday, felt something \"pop\" and seemed to help.    He works as a .    Has regular neck soreness.    History of knee problems.      He reports having mild pain/discomfort at the elbow. He denies numbness or tingling.   Pain severity: 3/10  Pain quality: pins and needles  Frequency of symptoms: are constant  Aggravated by: nothing  Relieved by: nothing    Treatment up to this point:nothing  Has not tried: ice, Heat, Tylenol, NSAIDS, PT and Corticosteroid injection   Prior history of related problems: no prior problems with this area in the past    Usual level of work activity: .    Past medical history:  has a past medical history of Anxiety, Depressive disorder, and NO ACTIVE PROBLEMS. " "  Patient Active Problem List   Diagnosis     CARDIOVASCULAR SCREENING; LDL GOAL LESS THAN 160       Past surgical history:  has a past surgical history that includes surgical history of -  and orthopedic surgery.     Medications:   Current Outpatient Medications:      cefdinir (OMNICEF) 300 MG capsule, 1 capsule 2 times daily, Disp: , Rfl:     Allergies:     Allergies   Allergen Reactions     Doxycycline GI Disturbance        Family History: family history is not on file.     Social History: .  reports that he has been smoking. He has been smoking about 0.50 packs per day. He has never used smokeless tobacco. He reports current alcohol use. He reports that he does not use drugs.    Review of Systems:  ROS: 10 point ROS neg other than the symptoms noted above in the HPI and past medical history.    Physical Exam  GENERAL APPEARANCE: healthy, alert, no distress.   SKIN: no suspicious lesions or rashes  RESPIRATORY: No increased work of breathing.  NEURO: Normal strength and tone, mentation intact and speech normal  PSYCH:  mentation appears normal and affect normal. Not anxious.  Hands: no clubbing, nail pitting or nodes.    /81   Pulse 88   Ht 1.778 m (5' 10\")   Wt 98.4 kg (217 lb)   BMI 31.14 kg/m         left UPPER EXTREMITY:    Sensation intact to light touch in median, radial, ulnar and axillary nerve distributions, but states feels slight decreased compared to the right over the finger tips, palm, forearm.  Palpable 2+ radial pulse, brisk capillary refill to all fingers, wwp  Intact epl fpl fdp edc wrist flexion/extension biceps triceps deltoid  DTR's normal biceps and brachioradialis    ELBOW:  Skin intact. No open wounds. No skin maceration.  Inspection: no swelling, no ecchymosis, no olecranon bursa swelling, no distal bicep tendon defect  Tender: medial elbow/ulnar nerve  Range of Motion: all normal  Strength: elbow strength full  There is no deformity in the area.    Positive Tinels " at the medial elbow. positive ulnar nerve compression medial elbow    WRIST/HAND  Positive tinels at the wrist over the median nerve  Equivocal median nerve compression test at the wrist.  Slight decreased  strength of the hand  Negative froments sign   Good interossei strength        X-rays: none indicated    Assessment: 31 year old male , right -hand dominant, with acute left upper extremity pain, numbness and tingling, likely transient ulnar neuropathy, possible carpal tunnel syndrome.      * discussed with patient signs and symptoms consistent with ulnar neuropathy / cubital tunnel syndrome. Cubital tunnel syndrome is compression or pinching of the ulnar nerve at the elbow. There are many different causes, and in most cases, multifactorial.  * could be related from leaning on his elbow while driving, having elbow bent at night    * An indepth discussion was had with him about the options for treatment, which included activity modification to avoid aggravating activities, taking breaks during activities that cause symptoms, stretching, NSAIDS to help decrease inflammation and swelling within the cubital tunnel, night splinting, and lastly some sort of surgery.    * depending upon severity and duration of symptoms, nonoperative treatment is usually initiated, starting with least invasive modalities such as activity modification and a trial of night splints and NSAIDs.    * avoid leaning on elbow, avoid continuous positioning with elbow being bent especially at night  * will try a course of oral steroids  * consider EMG , hand therapy referral in future for night extension splints as needed.    *return to clinic as needed.  * all patient's questions addressed and answered today.    * All questions were addressed and answered prior to discharge from clinic today. The patient acknowledges an understanding of and agreement with the plan set forth during today's visit. Patient was advised to call our office or  MyChart us if any further questions arise upon leaving our office today.        Randolph Hunter M.D., M.S.  Dept. of Orthopaedic Surgery  [facility]  .      Randolph Hunter M.D., M.S.  Dept. of Orthopaedic Surgery  [facility]      Again, thank you for allowing me to participate in the care of your patient.        Sincerely,        Randolph Hunter MD

## 2022-08-15 ENCOUNTER — OFFICE VISIT (OUTPATIENT)
Dept: URGENT CARE | Facility: URGENT CARE | Age: 32
End: 2022-08-15
Payer: COMMERCIAL

## 2022-08-15 VITALS
BODY MASS INDEX: 30.22 KG/M2 | WEIGHT: 210.6 LBS | DIASTOLIC BLOOD PRESSURE: 82 MMHG | OXYGEN SATURATION: 98 % | TEMPERATURE: 97.8 F | SYSTOLIC BLOOD PRESSURE: 123 MMHG | HEART RATE: 83 BPM

## 2022-08-15 DIAGNOSIS — S09.90XA INJURY OF HEAD, INITIAL ENCOUNTER: ICD-10-CM

## 2022-08-15 DIAGNOSIS — G44.209 TENSION HEADACHE: Primary | ICD-10-CM

## 2022-08-15 PROCEDURE — 99214 OFFICE O/P EST MOD 30 MIN: CPT | Mod: 25 | Performed by: NURSE PRACTITIONER

## 2022-08-15 PROCEDURE — 96372 THER/PROPH/DIAG INJ SC/IM: CPT | Performed by: NURSE PRACTITIONER

## 2022-08-15 RX ORDER — KETOROLAC TROMETHAMINE 30 MG/ML
60 INJECTION, SOLUTION INTRAMUSCULAR; INTRAVENOUS ONCE
Status: COMPLETED | OUTPATIENT
Start: 2022-08-15 | End: 2022-08-15

## 2022-08-15 RX ADMIN — KETOROLAC TROMETHAMINE 60 MG: 30 INJECTION, SOLUTION INTRAMUSCULAR; INTRAVENOUS at 14:50
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
## 2022-09-08 ENCOUNTER — OFFICE VISIT (OUTPATIENT)
Dept: PALLIATIVE MEDICINE | Facility: CLINIC | Age: 32
End: 2022-09-08
Payer: COMMERCIAL

## 2022-09-08 VITALS
OXYGEN SATURATION: 95 % | DIASTOLIC BLOOD PRESSURE: 79 MMHG | SYSTOLIC BLOOD PRESSURE: 110 MMHG | HEART RATE: 85 BPM | TEMPERATURE: 98.4 F | WEIGHT: 210 LBS | BODY MASS INDEX: 30.13 KG/M2

## 2022-09-08 DIAGNOSIS — G44.329 CHRONIC POST-TRAUMATIC HEADACHE, NOT INTRACTABLE: Primary | ICD-10-CM

## 2022-09-08 DIAGNOSIS — S06.0X9A CONCUSSION WITH LOSS OF CONSCIOUSNESS, INITIAL ENCOUNTER: ICD-10-CM

## 2022-09-08 DIAGNOSIS — S13.4XXA WHIPLASH INJURIES, INITIAL ENCOUNTER: ICD-10-CM

## 2022-09-08 PROCEDURE — 99204 OFFICE O/P NEW MOD 45 MIN: CPT | Performed by: PHYSICAL MEDICINE & REHABILITATION

## 2022-09-08 RX ORDER — AMITRIPTYLINE HYDROCHLORIDE 10 MG/1
10 TABLET ORAL AT BEDTIME
Qty: 30 TABLET | Refills: 3 | Status: SHIPPED | OUTPATIENT
Start: 2022-09-08 | End: 2023-01-04

## 2022-09-08 RX ORDER — LIDOCAINE 4 G/G
1 PATCH TOPICAL DAILY PRN
Qty: 30 PATCH | Refills: 3 | Status: SHIPPED | OUTPATIENT
Start: 2022-09-08 | End: 2022-10-08
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
## 2022-11-19 ENCOUNTER — HEALTH MAINTENANCE LETTER (OUTPATIENT)
Age: 32
End: 2022-11-19

## 2023-01-04 ENCOUNTER — VIRTUAL VISIT (OUTPATIENT)
Dept: PHYSICAL MEDICINE AND REHAB | Facility: CLINIC | Age: 33
End: 2023-01-04
Payer: COMMERCIAL

## 2023-01-04 DIAGNOSIS — R53.83 FATIGUE DUE TO OLD HEAD INJURY: ICD-10-CM

## 2023-01-04 DIAGNOSIS — G47.9 SLEEP DISTURBANCE: Primary | ICD-10-CM

## 2023-01-04 DIAGNOSIS — S06.0X0D CONCUSSION WITHOUT LOSS OF CONSCIOUSNESS, SUBSEQUENT ENCOUNTER: ICD-10-CM

## 2023-01-04 DIAGNOSIS — S09.90XS FATIGUE DUE TO OLD HEAD INJURY: ICD-10-CM

## 2023-01-04 DIAGNOSIS — G44.329 CHRONIC POST-TRAUMATIC HEADACHE, NOT INTRACTABLE: ICD-10-CM

## 2023-01-04 PROCEDURE — 99214 OFFICE O/P EST MOD 30 MIN: CPT | Mod: TEL | Performed by: PHYSICAL MEDICINE & REHABILITATION

## 2023-01-04 RX ORDER — TRAZODONE HYDROCHLORIDE 50 MG/1
50 TABLET, FILM COATED ORAL
Qty: 30 TABLET | Refills: 3 | Status: SHIPPED | OUTPATIENT
Start: 2023-01-04 | End: 2023-02-03

## 2023-01-04 NOTE — NURSING NOTE
Chief Complaint   Patient presents with     Head Injury     3 months concussion recheck       There were no vitals filed for this visit.    There is no height or weight on file to calculate BMI.
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
## 2024-01-28 ENCOUNTER — HEALTH MAINTENANCE LETTER (OUTPATIENT)
Age: 34
End: 2024-01-28

## 2024-01-31 ENCOUNTER — ANCILLARY PROCEDURE (OUTPATIENT)
Dept: GENERAL RADIOLOGY | Facility: CLINIC | Age: 34
End: 2024-01-31
Attending: FAMILY MEDICINE

## 2024-01-31 ENCOUNTER — OFFICE VISIT (OUTPATIENT)
Dept: URGENT CARE | Facility: URGENT CARE | Age: 34
End: 2024-01-31
Payer: OTHER MISCELLANEOUS

## 2024-01-31 VITALS
WEIGHT: 215 LBS | DIASTOLIC BLOOD PRESSURE: 97 MMHG | RESPIRATION RATE: 18 BRPM | TEMPERATURE: 98.2 F | SYSTOLIC BLOOD PRESSURE: 134 MMHG | BODY MASS INDEX: 30.85 KG/M2 | OXYGEN SATURATION: 97 % | HEART RATE: 85 BPM

## 2024-01-31 DIAGNOSIS — S29.9XXA INJURY OF BACK OF THORAX, INITIAL ENCOUNTER: ICD-10-CM

## 2024-01-31 DIAGNOSIS — M54.6 ACUTE MIDLINE THORACIC BACK PAIN: ICD-10-CM

## 2024-01-31 DIAGNOSIS — S29.9XXA INJURY OF BACK OF THORAX, INITIAL ENCOUNTER: Primary | ICD-10-CM

## 2024-01-31 PROCEDURE — 99214 OFFICE O/P EST MOD 30 MIN: CPT | Performed by: FAMILY MEDICINE

## 2024-01-31 PROCEDURE — 72070 X-RAY EXAM THORAC SPINE 2VWS: CPT | Mod: TC | Performed by: RADIOLOGY

## 2024-01-31 RX ORDER — METHYLPREDNISOLONE 4 MG
TABLET, DOSE PACK ORAL
Qty: 21 TABLET | Refills: 0 | Status: SHIPPED | OUTPATIENT
Start: 2024-01-31

## 2024-01-31 RX ORDER — CYCLOBENZAPRINE HCL 5 MG
5 TABLET ORAL
Qty: 30 TABLET | Refills: 0 | Status: SHIPPED | OUTPATIENT
Start: 2024-01-31

## 2024-01-31 ASSESSMENT — PAIN SCALES - GENERAL: PAINLEVEL: SEVERE PAIN (7)

## 2024-01-31 NOTE — LETTER
January 31, 2024      Richardson Preston  [address] [place] [address] [address] [place] [address]        To Whom It May Concern:    Richardson Preston was seen in our clinic. Richardson Preston was seen in our clinic. He may return to work on Monday, 2/5/2023.         Sincerely,        Cathy Grier MD

## 2024-01-31 NOTE — PROGRESS NOTES
ASSESSMENT/PLAN:      ICD-10-CM    1. Injury of back of thorax, initial encounter  S29.9XXA XR Thoracic Spine 2 Views     methylPREDNISolone (MEDROL DOSEPAK) 4 MG tablet therapy pack     cyclobenzaprine (FLEXERIL) 5 MG tablet     Spine  Referral      2. Acute midline thoracic back pain  M54.6 XR Thoracic Spine 2 Views     methylPREDNISolone (MEDROL DOSEPAK) 4 MG tablet therapy pack     cyclobenzaprine (FLEXERIL) 5 MG tablet     Spine  Referral          Patient Instructions     Start medrol ( steroid) dose thomas as directed for your back pain.    Start flexeril at bedtime-muscle relaxant -will make you sleepy, take second dose if wake up in pain       For pain    Start  Ibuprofen (motrin/advil)  600 mg 3 times a day always take with food for the next 3 to 5 days until pain resolves-maximum dose of ibuprofen is 2400 mg in 24 hours     Can take acetaminophen between doses of ibuprofen for spikes of pain as noted below     Acetaminophen (Tylenol ) 1000 mg 3 times a day for the next 3 to 5 days until pain resolves-maximum dose of acetaminophen (tylenol)  is 3000 mg in 24-hours       Ice or heat to area which ever works the best for your pain, can alternate as needed as well    No lifting more than 10 lbs-10 lbs equals a 1 gallon jug of milk     Will contact you with results of your xray    Call spinal  service tomorrow in am-should be open by 8 am to schedule appointment to have back evaluated Monday /Tuesday if pain no [phone].         Called patient and reviewed xray results, no acute changes, will continue with plan a noted    Reviewed medication instructions and side effects. Follow up if experiences side effects.     I reviewed supportive care, otc meds to use if needed, expected course, and signs of concern.  Follow up as needed or if he does not improve within  1-2 days or if worsens in any way.  Reviewed red flag symptoms and is to go to the ER if experiences any of  these.     The use of Dragon/Perceptive Pixel dictation services may have been used to construct the content in this note; any grammatical or spelling errors are non-intentional. Please contact the author of this note directly if you are in need of any clarification.      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, no longer than usual and started to move to another position and felt a pop/then constant 10/10 pain in mid thoracic spine /paraspinous muscles bilateral   Description:   Location of pain: middle of back mid/bilateral paraspinous muscles   Character of pain: sharp and constant  Pain radiation:from mid thoracic spine wraps bilateral to lateral lower ribs/chest wall  New numbness or weakness in legs, not attributed to pain:  no   Intensity: Currently 10/10  History:   Pain interferes with job: YES,   History of back problems: recurrent self limited episodes of low back occasional neck pain in the past, NO hx of thoracic spine pain or injury to spine   Any previous MRI or X-rays: None  Sees a specialist for back pain:  No  Therapies tried without relief: cold-ice   Alleviating factors:   Improved by: cold-using ice for very brief period of time, once ice starts to melt, pain returns and quickly back to 10/10 pain   Precipitating factors:  Worsened by: Lifting even <5 lbs, Bending, Standing,for periods of time Sitting for periods of time,  Lying Flat, Walking, and Coughing        Accompanying Signs & Symptoms:  Risk of Fracture:  None  Risk of Cauda Equina:  None  Risk of Infection:  None  Risk of Cancer:  None  Risk of Ankylosing Spondylitis:  Onset at age <35, male, AND morning back stiffness. no     Past Medical History:   Diagnosis Date    Anxiety     Depressive disorder     NO ACTIVE PROBLEMS      Social History     Tobacco Use    Smoking status: Every Day     Packs/day: .5     Types: Cigarettes    Smokeless tobacco: Never   Substance Use Topics    Alcohol use: Yes     Comment: occ       Current Outpatient Medications   Medication Sig Dispense Refill    cyclobenzaprine (FLEXERIL) 5 MG tablet Take 1 tablet (5 mg) by mouth at bedtime as needed, may repeat once for muscle spasms 30 tablet 0    methylPREDNISolone (MEDROL DOSEPAK) 4 MG tablet therapy pack Follow Package Directions 21 tablet 0    traZODone (DESYREL) 50 MG tablet Take 1 tablet (50 mg) by mouth nightly as needed for sleep 30 tablet 3     Allergies   Allergen Reactions    Doxycycline GI Disturbance             ROS are negative, except as otherwise noted HPI      Objective    BP (!) 134/97   Pulse 85   Temp 98.2  F (36.8  C) (Tympanic)   Resp 18   Wt 97.5 kg (215 lb)   SpO2 97%   BMI 30.85 kg/m    Body mass index is 30.85 kg/m .  Physical Exam   GENERAL: alert and no distress  NEURO: Normal strength and tone upper and lower extremities, normal sensation, mentation intact and speech normal, normal gait-slow deliberated due thoracic back pain   BACK: tender over T9/T10 vertebral bodies, paraspinous muscle tenderness from T5-T12, limited extension due to pain, full flexion with  moderate pain  no CVA tenderness, no paralumbar tenderness      Diagnostic Test Results:  Labs reviewed in Epic  Results for orders placed or performed in visit on 01/31/24   XR Thoracic Spine 2 Views     Status: None    Narrative    EXAM: XR THORACIC SPINE 2 VIEWS  LOCATION: [facility]  [place]  DATE: 1/31/2024    INDICATION: Acute onset of mid thoracic pain felt pop when in static position at work  tender over t 8 T9, no hx of injury or thoracic pain in the past  COMPARISON: None.      Impression    IMPRESSION:     The upper thoracic vertebral bodies near the cervicothoracic junction are not well evaluated in the lateral projection due to superimposed structures. The remaining imaged thoracic vertebral bodies are unremarkable in height.    Alignment is unremarkable.    Intervertebral disc spaces appear preserved. Mild degenerative endplate contour irregularity at multiple levels.    Pedicles appear symmetric.    Visualized lung fields are well aerated.

## 2024-01-31 NOTE — LETTER
January 31, 2024      Richardson Preston  [address] [place] [address] [address] [place] [address]        To Whom It May Concern:    Richardson Preston was seen in our clinic. He may return to work once cleared by the spine specialist.        Sincerely,      Cathy Grier

## 2024-01-31 NOTE — LETTER
January 31, 2024      Richardson Preston  [facility] [address] [place] [address]        To Whom It May Concern:    Richardson Preston was seen in our clinic.  He may return to work on Monday, 2/5/2023.     Sincerely,      Cathy Grier

## 2024-01-31 NOTE — PATIENT INSTRUCTIONS
Start medrol ( steroid) dose thomas as directed for your back pain.    Start flexeril at bedtime-muscle relaxant -will make you sleepy, take second dose if wake up in pain       For pain    Start  Ibuprofen (motrin/advil)  600 mg 3 times a day always take with food for the next 3 to 5 days until pain resolves-maximum dose of ibuprofen is 2400 mg in 24 hours     Can take acetaminophen between doses of ibuprofen for spikes of pain as noted below     Acetaminophen (Tylenol ) 1000 mg 3 times a day for the next 3 to 5 days until pain resolves-maximum dose of acetaminophen (tylenol)  is 3000 mg in 24-hours       Ice or heat to area which ever works the best for your pain, can alternate as needed as well    No lifting more than 10 lbs-10 lbs equals a 1 gallon jug of milk     Will contact you with results of your xray    Call spinal  service tomorrow in am-should be open by 8 am to schedule appointment to have back evaluated Monday /Tuesday if pain no [phone].

## 2025-02-01 ENCOUNTER — HEALTH MAINTENANCE LETTER (OUTPATIENT)
Age: 35
End: 2025-02-01

## 2025-04-02 ENCOUNTER — OFFICE VISIT (OUTPATIENT)
Dept: URGENT CARE | Facility: URGENT CARE | Age: 35
End: 2025-04-02
Payer: COMMERCIAL

## 2025-04-02 VITALS
WEIGHT: 212.2 LBS | OXYGEN SATURATION: 99 % | BODY MASS INDEX: 30.45 KG/M2 | DIASTOLIC BLOOD PRESSURE: 88 MMHG | HEART RATE: 114 BPM | TEMPERATURE: 97.2 F | RESPIRATION RATE: 20 BRPM | SYSTOLIC BLOOD PRESSURE: 121 MMHG

## 2025-04-02 DIAGNOSIS — R50.9 FEVER, UNSPECIFIED FEVER CAUSE: ICD-10-CM

## 2025-04-02 DIAGNOSIS — J06.9 VIRAL URI: Primary | ICD-10-CM

## 2025-04-02 LAB
DEPRECATED S PYO AG THROAT QL EIA: NEGATIVE
FLUAV AG SPEC QL IA: NEGATIVE
FLUBV AG SPEC QL IA: NEGATIVE
S PYO DNA THROAT QL NAA+PROBE: NOT DETECTED
SARS-COV-2 RNA RESP QL NAA+PROBE: NEGATIVE

## 2025-04-02 PROCEDURE — 87804 INFLUENZA ASSAY W/OPTIC: CPT | Performed by: STUDENT IN AN ORGANIZED HEALTH CARE EDUCATION/TRAINING PROGRAM

## 2025-04-02 PROCEDURE — 3079F DIAST BP 80-89 MM HG: CPT | Performed by: STUDENT IN AN ORGANIZED HEALTH CARE EDUCATION/TRAINING PROGRAM

## 2025-04-02 PROCEDURE — 3074F SYST BP LT 130 MM HG: CPT | Performed by: STUDENT IN AN ORGANIZED HEALTH CARE EDUCATION/TRAINING PROGRAM

## 2025-04-02 PROCEDURE — 87635 SARS-COV-2 COVID-19 AMP PRB: CPT | Performed by: STUDENT IN AN ORGANIZED HEALTH CARE EDUCATION/TRAINING PROGRAM

## 2025-04-02 PROCEDURE — 87651 STREP A DNA AMP PROBE: CPT | Performed by: STUDENT IN AN ORGANIZED HEALTH CARE EDUCATION/TRAINING PROGRAM

## 2025-04-02 PROCEDURE — 99213 OFFICE O/P EST LOW 20 MIN: CPT | Performed by: STUDENT IN AN ORGANIZED HEALTH CARE EDUCATION/TRAINING PROGRAM

## 2025-04-02 NOTE — LETTER
April 2, 2025      Richardson Preston  [facility] [place] [address]        To Whom It May Concern:    Richardson Preston  was seen on 4/2/25.  He may return to work on Monday, April 7,. 2025. Please excuse his absences from work due to illness.        Sincerely,        JONAH Camacho CNP    Electronically signed

## 2025-04-02 NOTE — PROGRESS NOTES
Assessment & Plan     Viral URI  Lab test rapid strep and PCR and flu are negative. Awaiting Covid test result. I advised treating viral URI with supportive measures of rest, pushing fluids to avoid dehydration, OTC Tylenol or ibuprofen as needed per label directions for pain or fever. Discussed that symptoms of viral illnesses tend to be worst on days 2-4 then gradually improve over the course of 7-10 days. We also discussed that if the symptoms persist, I recommended patient is seen again by a provider either in urgent care or in family practice.     Fever, unspecified fever cause  - Streptococcus A Rapid Screen w/Reflex to PCR - Clinic Collect  - Influenza A & B Antigen - Clinic Collect  - COVID-19 Virus (Coronavirus) by PCR Nose  - Group A Streptococcus PCR Throat Swab         No follow-ups on file.    Sulma Olson, JONAH [facility] [place]    Sydni Bai is a 34 year old male who presents to clinic today for the following health issues:  Chief Complaint   Patient presents with    Fever     Fever as high as 102.5, body aches, sinus, cough, sore throat for the last 4 days        HPI      Review of Systems  Constitutional, HEENT, cardiovascular, pulmonary, GI, , musculoskeletal, neuro, skin, endocrine and psych systems are negative, except as otherwise noted.      Objective    /88   Pulse 114   Temp 97.2  F (36.2  C) (Tympanic)   Resp 20   Wt 96.3 kg (212 lb 3.2 oz)   SpO2 99%   BMI 30.45 kg/m    Physical Exam   GENERAL: alert and no distress  EYES: Eyes grossly normal to inspection, PERRL and conjunctivae and sclerae normal  HENT: ear canals and TM's normal, nose and mouth without ulcers or lesions  NECK: no adenopathy, no asymmetry, masses, or scars  RESP: lungs clear to auscultation - no rales, rhonchi or wheezes  CV: regular rate and rhythm, normal S1 S2, no S3 or S4, no murmur, click or rub, no peripheral edema  MS: no gross musculoskeletal defects noted,  no edema  SKIN: no suspicious lesions or rashes  NEURO: Normal strength and tone, mentation intact and speech normal  PSYCH: mentation appears normal, affect normal/bright    Results for orders placed or performed in visit on 04/02/25 (from the past 24 hours)   Streptococcus A Rapid Screen w/Reflex to PCR - Clinic Collect    Specimen: Throat; Swab   Result Value Ref Range    Group A Strep antigen Negative Negative   Influenza A & B Antigen - Clinic Collect    Specimen: Nose; Swab   Result Value Ref Range    Influenza A antigen Negative Negative    Influenza B antigen Negative Negative    Narrative    Test results must be correlated with clinical data. If necessary, results should be confirmed by a molecular assay or viral culture.   Group A Streptococcus PCR Throat Swab    Specimen: Throat; Swab   Result Value Ref Range    Group A strep by PCR Not Detected Not Detected    Narrative    The Xpert Xpress Strep A test, performed on the [employer] Systems, is a rapid, qualitative in vitro diagnostic test for the detection of Streptococcus pyogenes (Group A ß-hemolytic Streptococcus, Strep A) in throat swab specimens from patients with signs and symptoms of pharyngitis. The Xpert Xpress Strep A test can be used as an aid in the diagnosis of Group A Streptococcal pharyngitis. The assay is not intended to monitor treatment for Group A Streptococcus infections. The Xpert Xpress Strep A test utilizes an automated real-time polymerase chain reaction (PCR) to detect Streptococcus pyogenes DNA.